# Patient Record
Sex: MALE | Race: ASIAN | NOT HISPANIC OR LATINO | URBAN - METROPOLITAN AREA
[De-identification: names, ages, dates, MRNs, and addresses within clinical notes are randomized per-mention and may not be internally consistent; named-entity substitution may affect disease eponyms.]

---

## 2024-11-01 ENCOUNTER — EMERGENCY (EMERGENCY)
Facility: HOSPITAL | Age: 34
LOS: 1 days | Discharge: HOME | End: 2024-11-01
Admitting: EMERGENCY MEDICINE
Payer: SELF-PAY

## 2024-11-01 VITALS
HEIGHT: 71 IN | TEMPERATURE: 99 F | HEART RATE: 787 BPM | OXYGEN SATURATION: 96 % | DIASTOLIC BLOOD PRESSURE: 70 MMHG | RESPIRATION RATE: 16 BRPM | SYSTOLIC BLOOD PRESSURE: 120 MMHG | WEIGHT: 175.05 LBS

## 2024-11-01 DIAGNOSIS — S01.111A LACERATION WITHOUT FOREIGN BODY OF RIGHT EYELID AND PERIOCULAR AREA, INITIAL ENCOUNTER: ICD-10-CM
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
## 2024-11-01 PROCEDURE — 70486 CT MAXILLOFACIAL W/O DYE: CPT | Mod: 26,MC

## 2024-11-01 PROCEDURE — 99284 EMERGENCY DEPT VISIT MOD MDM: CPT

## 2024-11-01 RX ORDER — ACETAMINOPHEN 500 MG
650 TABLET ORAL ONCE
Refills: 0 | Status: COMPLETED | OUTPATIENT
Start: 2024-11-01 | End: 2024-11-01

## 2024-11-01 RX ORDER — LIDOCAINE HCL 60 MG/3 ML
20 SYRINGE (ML) INJECTION ONCE
Refills: 0 | Status: COMPLETED | OUTPATIENT
Start: 2024-11-01 | End: 2024-11-01

## 2024-11-01 RX ADMIN — Medication 20 MILLILITER(S): at 21:08

## 2024-11-01 RX ADMIN — Medication 650 MILLIGRAM(S): at 21:08
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
## 2024-11-01 NOTE — ED PROVIDER NOTE - PROGRESS NOTE DETAILS
Pt is sitting comfortably in room, no acute distress  Pt signed out to me pending plastic surgery to repair wound  Wound repaired without incidence  All results reviewed with pt. Pt understands and agrees with plan. Agreed to follow up with pcp in 2-3 days
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
## 2024-11-01 NOTE — PROCEDURE NOTE - ADDITIONAL PROCEDURE DETAILS
Pre-Procedure Diagnosis:   1. Right Upper Eyelid Laceration injury     Post-Procedure Diagnosis:   1. 3.5cm complex Right Upper Eyelid Laceration     Procedure:   1. Complex Laceration Repair of 3.5cm  Right Upper Eyelid Laceration     Procedure Details:   The Right forehead/francisco-orbital area was cleansed with normal saline and hydrogen peroxide to remove any dry blood dirt or debris. Then 1% Lidocaine with Epi was inject into and surrounding the laceration. A period of 5-8 minutes of observation ensued to allow for the anesthetic to take effect. Following which I prepped and draped the area in the usual sterile fashion. The laceration was transverse along the upper third of the upper eyelid with a curvilinear configuration with denuded sheared thin skin along the entire inferior edge. With sharp scissors the devitalized thin curled in skin was sharply excised back to healthy bleeding edges. I then irrigated the wound copiously to ensure no foreign material remained. Then with curved scissors performed circumferential undermining in a subcutaneous plane to lessen the tension upon closure, more extensive undermining was performed caudally as that had been the area of skin excision and required the most advancement. I then proceeded to repair the deeper underlying muscle with 5-0 vicryl sutures, placed meticulously in interrupted fashion. Followed by 5-0 Monocryl placed to the overlying soft tissue/deep dermal plane. This brought the skin edges together in an almost touching configuration. Lastly, I placed fine filament 5-0 plain gut sutures in both interrupted and running orientation to render meticulous reapproximation of the skin edges.     Following this the prepped skin was wiped clean. Bacitracin ointment deferred and dermabond placed for further wound protection based on conversations with patient.    Wound care was iterated to the patient, as well as my recommended plan for follow up of 7-10 days in the office.

## 2024-11-01 NOTE — ED PROVIDER NOTE - PATIENT PORTAL LINK FT
You can access the FollowMyHealth Patient Portal offered by [facility] by registering at the following website: [email or web address]. By joining [facility]’s FollowMyHealth portal, you will also be able to view your health information using other applications (apps) compatible with our system.
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
## 2024-11-01 NOTE — PROCEDURE NOTE - NSLAC1DETAILSPROC_SKIN_A_CORE
wound explored to base in bloodless field/no foreign body/undermining performed/non-extensive debridement
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
## 2024-11-01 NOTE — ED PROVIDER NOTE - PHYSICAL EXAMINATION
General: Well appearing in no acute distress, alert and cooperative  Head: Normocephalic. No palpable scalp hematomas or areas of scalp tenderness. See eye exam.   Eyes: PERRLA, no conjunctival injection, no scleral icterus, EOMI. +some superior periorbital swelling. No tenderness to palpation periorbitally. Approximately 3cm linear lac to lower right eyelid with surrounding swelling.   ENMT: Atraumatic external nose and ears, moist mucous membranes, oropharynx clear. No nasal bridge tenderness to palpation.   Neck: Soft and supple, full ROM without pain, no midline tenderness  Cardiac: Regular rate and regular rhythm, no murmurs  Resp: Unlabored respiratory effort, lungs CTAB, speaking in full sentences, no wheezes  Skin: See eye exam.   Neuro: AO x 3, moves all extremities symmetrically, Motor strength 5/5 bilaterally UE and LE, sensation grossly intact.

## 2024-11-14 ENCOUNTER — EMERGENCY (EMERGENCY)
Facility: HOSPITAL | Age: 34
LOS: 1 days | Discharge: HOME | End: 2024-11-14
Attending: EMERGENCY MEDICINE | Admitting: EMERGENCY MEDICINE
Payer: SELF-PAY

## 2024-11-14 VITALS
HEIGHT: 71 IN | HEART RATE: 83 BPM | DIASTOLIC BLOOD PRESSURE: 80 MMHG | OXYGEN SATURATION: 98 % | SYSTOLIC BLOOD PRESSURE: 128 MMHG | TEMPERATURE: 98 F | RESPIRATION RATE: 16 BRPM
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
## 2024-11-14 DIAGNOSIS — S09.90XA UNSPECIFIED INJURY OF HEAD, INITIAL ENCOUNTER: ICD-10-CM
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
## 2024-11-14 DIAGNOSIS — S16.1XXA STRAIN OF MUSCLE, FASCIA AND TENDON AT NECK LEVEL, INITIAL ENCOUNTER: ICD-10-CM

## 2024-11-14 DIAGNOSIS — M25.511 PAIN IN RIGHT SHOULDER: ICD-10-CM

## 2024-11-14 PROCEDURE — 99284 EMERGENCY DEPT VISIT MOD MDM: CPT

## 2024-11-14 RX ORDER — METHOCARBAMOL 500 MG/1
1500 TABLET ORAL ONCE
Refills: 0 | Status: DISCONTINUED | OUTPATIENT
Start: 2024-11-14 | End: 2024-11-18

## 2024-11-14 RX ORDER — METHOCARBAMOL 500 MG/1
2 TABLET ORAL
Qty: 20 | Refills: 0
Start: 2024-11-14

## 2024-11-14 NOTE — ED PROVIDER NOTE - CLINICAL SUMMARY MEDICAL DECISION MAKING FREE TEXT BOX
34-year-old male presenting for evaluation after minor head injury.    He experienced some fleeting hand tingling which has rapidly resolved and has no residual midline neck tenderness.   His trapezius muscles are tight bilaterally. He has no worrisome symptoms or physical exam findings.  No CT imaging is indicated.  Reassurance was provided, concussion precautions and return precautions reviewed.  Will start methocarbamol.  He prefers to take ibuprofen and acetaminophen at home.

## 2024-11-14 NOTE — ED PROVIDER NOTE - NSFOLLOWUPINSTRUCTIONS_ED_ALL_ED_FT
Post-Concussion Syndrome    Post-concussion syndrome is when symptoms last longer than normal after a head injury.  What are the signs or symptoms?  After a head injury, you may:  Have headaches. Feel tired. Feel dizzy. Feel weak. Have trouble seeing. Have trouble in bright lights .Have trouble hearing. Not be able to remember things. Not be able to focus. Have trouble sleeping. Have mood swings. Have trouble learning new things. These can last from weeks to months.    Follow these instructions at home:    Take all medicines only as told by your doctor.  Do not take prescription pain medicines.    Activity     Limit activities as told by your doctor. This includes:  Homework. Job-related work. Thinking. Watching TV. Using a computer or phone. Puzzles Exercise. Sports.  Slowly return to your normal activity as told by your doctor. Stop an activity if you have symptoms. Do not do anything that may cause you to get injured again.    General instructions     Rest. Try to:  Sleep 7–9 hours each night. Take naps or breaks when you feel tired during the day. Do not drink alcohol until your doctor says that you can. Keep track of your symptoms. Keep all follow-up visits as told by your doctor. This is important.     Contact a doctor if:  You do not improve. You get worse. You have another injury.   Get help right away if:  You have a very bad headache. You feel confused. You feel very sleepy. You pass out (faint). You throw up (vomit).You feel weak in any part of your body. You feel numb in any part of your body. You start shaking (have a seizure).You have trouble talking.     Summary  Post-concussion syndrome is when symptoms last longer than normal after a head injury. Limit all activity after your injury. Gradually return to normal activity as told by your doctor. Rest, do not drink alcohol, and avoid prescription pain medicines after a concussion. Call your doctor if your symptoms get worse.    This information is not intended to replace advice given to you by your health care provider. Make sure you discuss any questions you have with your health care provider.    Strain    If your neck continues to bother you please consider being evaluated by a sports medicine physician or physiatrist.  You could also try the methocarbamol 1500 mg before bed and 750 mg during the daytime as a muscle relaxant.    A strain is a stretch or tear in one of the muscles in your body. This is caused by an injury to the area such as a twisting mechanism. Symptoms include pain, swelling, or bruising. Rest that area over the next several days and slowly resume activity when tolerated. Ice can help with swelling and pain.     SEEK IMMEDIATE MEDICAL CARE IF YOU HAVE ANY OF THE FOLLOWING SYMPTOMS: worsening pain, inability to move that body part, numbness or tingling.     Consider follow up with Dr. Hogan or Dr. Sosa at [facility]. They are osteopathic MDs who can do bone and tissue adjustments as well as set up high quality physical therapy.   [address], [place] [address]  Phone: [phone]

## 2024-11-14 NOTE — ED PROVIDER NOTE - PATIENT PORTAL LINK FT
You can access the FollowMyHealth Patient Portal offered by [facility] by registering at the following website: [email or web address]. By joining [facility]’s FollowMyHealth portal, you will also be able to view your health information using other applications (apps) compatible with our system.

## 2024-11-14 NOTE — ED PROVIDER NOTE - CARE PLAN
1 Principal Discharge DX:	Minor head injury  Secondary Diagnosis:	Cervical strain  Secondary Diagnosis:	Cervical myofascial strain

## 2024-11-14 NOTE — ED ADULT TRIAGE NOTE - GLASGOW COMA SCALE: BEST VERBAL RESPONSE, MLM
Bedside and Verbal shift change report given to Momo Vizcaino RN (oncoming nurse) by Kevin Farrell RN (offgoing nurse). Report included the following information SBAR, Kardex, Intake/Output, MAR and Accordion. (V5) oriented
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
## 2024-11-14 NOTE — ED PROVIDER NOTE - PHYSICAL EXAMINATION
VITAL SIGNS: I have reviewed nursing notes and confirm.  CONST: Well-developed; well-nourished; No apparent distress.  ENT: No nasal discharge; airway clear.  EYES: Sclera clear. Pupils round and symmetrical bilaterally.  RESP: Breathing comfortably; speaking in full sentences.   MSK: No acute deformities noted to extremities. No tenderness to cervical/thoracic/lumbar spine to palpation. + tightness to both traps  NEURO: Alert, oriented. Speech is fluent and appropriate. Moving all extremities appropriately. No gross motor or sensory abnormalities.  SKIN: Skin is normal temperature; no diaphoresis; no pallor. No scalp or forehead hematoma.   PSYCH: Cooperative. Appropriate mood, language, and behavior.

## 2024-11-15 PROBLEM — Z78.9 OTHER SPECIFIED HEALTH STATUS: Chronic | Status: ACTIVE | Noted: 2024-11-01
